# Patient Record
Sex: FEMALE | Race: WHITE | NOT HISPANIC OR LATINO
[De-identification: names, ages, dates, MRNs, and addresses within clinical notes are randomized per-mention and may not be internally consistent; named-entity substitution may affect disease eponyms.]

---

## 2017-01-23 ENCOUNTER — APPOINTMENT (OUTPATIENT)
Dept: OPHTHALMOLOGY | Facility: CLINIC | Age: 77
End: 2017-01-23

## 2017-01-30 ENCOUNTER — APPOINTMENT (OUTPATIENT)
Dept: OPHTHALMOLOGY | Facility: CLINIC | Age: 77
End: 2017-01-30

## 2017-05-03 ENCOUNTER — FORM ENCOUNTER (OUTPATIENT)
Age: 77
End: 2017-05-03

## 2017-05-04 ENCOUNTER — APPOINTMENT (OUTPATIENT)
Dept: ULTRASOUND IMAGING | Facility: HOSPITAL | Age: 77
End: 2017-05-04

## 2017-05-04 ENCOUNTER — APPOINTMENT (OUTPATIENT)
Dept: RADIOLOGY | Facility: HOSPITAL | Age: 77
End: 2017-05-04

## 2017-05-04 ENCOUNTER — OUTPATIENT (OUTPATIENT)
Dept: OUTPATIENT SERVICES | Facility: HOSPITAL | Age: 77
LOS: 1 days | End: 2017-05-04
Payer: MEDICARE

## 2017-05-04 DIAGNOSIS — R14.0 ABDOMINAL DISTENSION (GASEOUS): ICD-10-CM

## 2017-05-04 DIAGNOSIS — K44.9 DIAPHRAGMATIC HERNIA WITHOUT OBSTRUCTION OR GANGRENE: ICD-10-CM

## 2017-05-04 PROCEDURE — 76700 US EXAM ABDOM COMPLETE: CPT

## 2017-05-04 PROCEDURE — 71046 X-RAY EXAM CHEST 2 VIEWS: CPT

## 2017-05-22 ENCOUNTER — APPOINTMENT (OUTPATIENT)
Dept: OPHTHALMOLOGY | Facility: CLINIC | Age: 77
End: 2017-05-22

## 2017-06-05 ENCOUNTER — APPOINTMENT (OUTPATIENT)
Dept: CT IMAGING | Facility: HOSPITAL | Age: 77
End: 2017-06-05

## 2017-06-05 ENCOUNTER — OUTPATIENT (OUTPATIENT)
Dept: OUTPATIENT SERVICES | Facility: HOSPITAL | Age: 77
LOS: 1 days | End: 2017-06-05
Payer: MEDICARE

## 2017-06-05 DIAGNOSIS — K44.9 DIAPHRAGMATIC HERNIA WITHOUT OBSTRUCTION OR GANGRENE: ICD-10-CM

## 2017-06-05 PROCEDURE — 74176 CT ABD & PELVIS W/O CONTRAST: CPT

## 2017-10-02 ENCOUNTER — APPOINTMENT (OUTPATIENT)
Dept: OPHTHALMOLOGY | Facility: CLINIC | Age: 77
End: 2017-10-02
Payer: MEDICARE

## 2017-10-02 DIAGNOSIS — H15.002 UNSPECIFIED SCLERITIS, LEFT EYE: ICD-10-CM

## 2017-10-02 PROCEDURE — 92012 INTRM OPH EXAM EST PATIENT: CPT

## 2017-10-05 ENCOUNTER — APPOINTMENT (OUTPATIENT)
Dept: INTERNAL MEDICINE | Facility: CLINIC | Age: 77
End: 2017-10-05
Payer: MEDICARE

## 2017-10-05 PROCEDURE — G0008: CPT

## 2017-10-05 PROCEDURE — 90662 IIV NO PRSV INCREASED AG IM: CPT

## 2017-10-18 ENCOUNTER — APPOINTMENT (OUTPATIENT)
Dept: INTERNAL MEDICINE | Facility: CLINIC | Age: 77
End: 2017-10-18
Payer: MEDICARE

## 2017-10-18 VITALS
HEIGHT: 63 IN | BODY MASS INDEX: 26.05 KG/M2 | WEIGHT: 147 LBS | HEART RATE: 76 BPM | OXYGEN SATURATION: 99 % | SYSTOLIC BLOOD PRESSURE: 144 MMHG | DIASTOLIC BLOOD PRESSURE: 77 MMHG

## 2017-10-18 DIAGNOSIS — R04.0 EPISTAXIS: ICD-10-CM

## 2017-10-18 DIAGNOSIS — Z00.00 ENCOUNTER FOR GENERAL ADULT MEDICAL EXAMINATION W/OUT ABNORMAL FINDINGS: ICD-10-CM

## 2017-10-18 PROCEDURE — 99214 OFFICE O/P EST MOD 30 MIN: CPT

## 2017-10-18 RX ORDER — BUDESONIDE 3 MG/1
3 CAPSULE, COATED PELLETS ORAL
Refills: 0 | Status: ACTIVE | COMMUNITY

## 2017-12-01 ENCOUNTER — APPOINTMENT (OUTPATIENT)
Dept: MAMMOGRAPHY | Facility: HOSPITAL | Age: 77
End: 2017-12-01

## 2017-12-08 LAB
25(OH)D3 SERPL-MCNC: 30.6 NG/ML
ALBUMIN SERPL ELPH-MCNC: 3.4 G/DL
ALP BLD-CCNC: 264 U/L
ALT SERPL-CCNC: 17 U/L
ANION GAP SERPL CALC-SCNC: 17 MMOL/L
APPEARANCE: CLEAR
AST SERPL-CCNC: 17 U/L
BACTERIA: NEGATIVE
BASOPHILS # BLD AUTO: 0.01 K/UL
BASOPHILS NFR BLD AUTO: 0.2 %
BILIRUB SERPL-MCNC: 0.4 MG/DL
BILIRUBIN URINE: NEGATIVE
BLOOD URINE: NEGATIVE
BUN SERPL-MCNC: 14 MG/DL
CALCIUM SERPL-MCNC: 8.9 MG/DL
CHLORIDE SERPL-SCNC: 101 MMOL/L
CHOLEST SERPL-MCNC: 181 MG/DL
CHOLEST/HDLC SERPL: 3.2 RATIO
CO2 SERPL-SCNC: 29 MMOL/L
COLOR: YELLOW
CREAT SERPL-MCNC: 0.66 MG/DL
EOSINOPHIL # BLD AUTO: 0.05 K/UL
EOSINOPHIL NFR BLD AUTO: 0.9
ERYTHROCYTE [SEDIMENTATION RATE] IN BLOOD BY WESTERGREN METHOD: 63 MM/HR
GLUCOSE QUALITATIVE U: NEGATIVE MG/DL
GLUCOSE SERPL-MCNC: 97 MG/DL
HBA1C MFR BLD HPLC: 6.1 %
HCT VFR BLD CALC: 37.5 %
HDLC SERPL-MCNC: 57 MG/DL
HGB BLD-MCNC: 11.9 G/DL
IMM GRANULOCYTES NFR BLD AUTO: 0.9 %
KETONES URINE: NEGATIVE
LDLC SERPL CALC-MCNC: 99 MG/DL
LEUKOCYTE ESTERASE URINE: NEGATIVE
LYMPHOCYTES # BLD AUTO: 1.08 K/UL
LYMPHOCYTES NFR BLD AUTO: 18.5 %
MAN DIFF?: NORMAL
MCHC RBC-ENTMCNC: 28.2 PG
MCHC RBC-ENTMCNC: 31.7 GM/DL
MCV RBC AUTO: 88.9 FL
MICROSCOPIC-UA: NORMAL
MONOCYTES # BLD AUTO: 0.34 K/UL
MONOCYTES NFR BLD AUTO: 5.8 %
NEUTROPHILS # BLD AUTO: 4.32 K/UL
NEUTROPHILS NFR BLD AUTO: 73.7 %
NITRITE URINE: NEGATIVE
PH URINE: 7
PLATELET # BLD AUTO: 471 K/UL
POTASSIUM SERPL-SCNC: 3.8 MMOL/L
PROT SERPL-MCNC: 6.8 G/DL
PROTEIN URINE: NEGATIVE MG/DL
RBC # BLD: 4.22 M/UL
RBC # FLD: 16 %
RED BLOOD CELLS URINE: 0 /HPF
SODIUM SERPL-SCNC: 147 MMOL/L
SPECIFIC GRAVITY URINE: 1
SQUAMOUS EPITHELIAL CELLS: 1 /HPF
T4 FREE SERPL-MCNC: 1.3 NG/DL
T4 SERPL-MCNC: 8.7 UG/DL
TRIGL SERPL-MCNC: 125 MG/DL
TSH SERPL-ACNC: 0.71 UIU/ML
UROBILINOGEN URINE: NEGATIVE MG/DL
WBC # FLD AUTO: 5.85 K/UL
WHITE BLOOD CELLS URINE: 1 /HPF

## 2017-12-13 ENCOUNTER — RX RENEWAL (OUTPATIENT)
Age: 77
End: 2017-12-13

## 2017-12-14 ENCOUNTER — RX RENEWAL (OUTPATIENT)
Age: 77
End: 2017-12-14

## 2018-02-15 ENCOUNTER — APPOINTMENT (OUTPATIENT)
Dept: OPHTHALMOLOGY | Facility: CLINIC | Age: 78
End: 2018-02-15

## 2018-04-19 ENCOUNTER — APPOINTMENT (OUTPATIENT)
Dept: OPHTHALMOLOGY | Facility: CLINIC | Age: 78
End: 2018-04-19
Payer: MEDICARE

## 2018-04-19 DIAGNOSIS — H40.051 OCULAR HYPERTENSION, RIGHT EYE: ICD-10-CM

## 2018-04-19 DIAGNOSIS — H11.31 CONJUNCTIVAL HEMORRHAGE, RIGHT EYE: ICD-10-CM

## 2018-04-19 PROCEDURE — 92012 INTRM OPH EXAM EST PATIENT: CPT

## 2018-06-08 ENCOUNTER — OUTPATIENT (OUTPATIENT)
Dept: OUTPATIENT SERVICES | Facility: HOSPITAL | Age: 78
LOS: 1 days | End: 2018-06-08
Payer: MEDICARE

## 2018-06-08 DIAGNOSIS — K50.90 CROHN'S DISEASE, UNSPECIFIED, WITHOUT COMPLICATIONS: ICD-10-CM

## 2018-06-08 PROCEDURE — 94060 EVALUATION OF WHEEZING: CPT

## 2018-06-08 PROCEDURE — 94060 EVALUATION OF WHEEZING: CPT | Mod: 26

## 2018-06-08 PROCEDURE — 94726 PLETHYSMOGRAPHY LUNG VOLUMES: CPT

## 2018-06-08 PROCEDURE — 94729 DIFFUSING CAPACITY: CPT

## 2018-08-23 ENCOUNTER — APPOINTMENT (OUTPATIENT)
Dept: OPHTHALMOLOGY | Facility: CLINIC | Age: 78
End: 2018-08-23

## 2018-08-29 ENCOUNTER — APPOINTMENT (OUTPATIENT)
Dept: OPHTHALMOLOGY | Facility: CLINIC | Age: 78
End: 2018-08-29
Payer: MEDICARE

## 2018-08-29 DIAGNOSIS — H15.102 UNSPECIFIED EPISCLERITIS, LEFT EYE: ICD-10-CM

## 2018-08-29 PROCEDURE — 92012 INTRM OPH EXAM EST PATIENT: CPT

## 2018-10-11 ENCOUNTER — APPOINTMENT (OUTPATIENT)
Dept: INTERNAL MEDICINE | Facility: CLINIC | Age: 78
End: 2018-10-11
Payer: MEDICARE

## 2018-10-11 PROCEDURE — 90662 IIV NO PRSV INCREASED AG IM: CPT

## 2018-10-11 PROCEDURE — G0008: CPT

## 2018-11-13 ENCOUNTER — APPOINTMENT (OUTPATIENT)
Dept: INTERNAL MEDICINE | Facility: CLINIC | Age: 78
End: 2018-11-13
Payer: MEDICARE

## 2018-11-13 VITALS
WEIGHT: 149 LBS | BODY MASS INDEX: 26.4 KG/M2 | SYSTOLIC BLOOD PRESSURE: 130 MMHG | DIASTOLIC BLOOD PRESSURE: 70 MMHG | RESPIRATION RATE: 16 BRPM | HEIGHT: 63 IN | HEART RATE: 70 BPM

## 2018-11-13 DIAGNOSIS — Z23 ENCOUNTER FOR IMMUNIZATION: ICD-10-CM

## 2018-11-13 PROCEDURE — 99214 OFFICE O/P EST MOD 30 MIN: CPT | Mod: 25

## 2018-11-13 PROCEDURE — 90670 PCV13 VACCINE IM: CPT

## 2018-11-13 PROCEDURE — G0009: CPT

## 2018-11-13 NOTE — HISTORY OF PRESENT ILLNESS
[FreeTextEntry1] : General follow up , CKD concerns, GI recent folllow up on budesonide for Crohn's disease [de-identified] : Now on budesoninde 3 caps a day for 6weeks, tapering doses\par

## 2019-05-06 ENCOUNTER — MED ADMIN CHARGE (OUTPATIENT)
Age: 79
End: 2019-05-06

## 2019-05-06 ENCOUNTER — APPOINTMENT (OUTPATIENT)
Dept: INTERNAL MEDICINE | Facility: CLINIC | Age: 79
End: 2019-05-06
Payer: MEDICARE

## 2019-05-06 PROCEDURE — 86580 TB INTRADERMAL TEST: CPT

## 2019-05-08 ENCOUNTER — OTHER (OUTPATIENT)
Age: 79
End: 2019-05-08

## 2019-05-13 ENCOUNTER — APPOINTMENT (OUTPATIENT)
Dept: OPHTHALMOLOGY | Facility: CLINIC | Age: 79
End: 2019-05-13
Payer: MEDICARE

## 2019-05-24 ENCOUNTER — APPOINTMENT (OUTPATIENT)
Dept: OPHTHALMOLOGY | Facility: CLINIC | Age: 79
End: 2019-05-24
Payer: MEDICARE

## 2019-05-24 DIAGNOSIS — H18.20 UNSPECIFIED CORNEAL EDEMA: ICD-10-CM

## 2019-05-24 DIAGNOSIS — H26.9 UNSPECIFIED CATARACT: ICD-10-CM

## 2019-05-24 DIAGNOSIS — H40.003 PREGLAUCOMA, UNSPECIFIED, BILATERAL: ICD-10-CM

## 2019-05-24 PROCEDURE — 92133 CPTRZD OPH DX IMG PST SGM ON: CPT

## 2019-05-24 PROCEDURE — 76514 ECHO EXAM OF EYE THICKNESS: CPT

## 2019-05-24 PROCEDURE — 92286 ANT SGM IMG I&R SPECLR MIC: CPT

## 2019-05-24 PROCEDURE — 92012 INTRM OPH EXAM EST PATIENT: CPT

## 2019-08-14 ENCOUNTER — APPOINTMENT (OUTPATIENT)
Dept: INTERNAL MEDICINE | Facility: CLINIC | Age: 79
End: 2019-08-14
Payer: MEDICARE

## 2019-08-14 ENCOUNTER — RX RENEWAL (OUTPATIENT)
Age: 79
End: 2019-08-14

## 2019-08-14 VITALS
HEIGHT: 63 IN | DIASTOLIC BLOOD PRESSURE: 62 MMHG | WEIGHT: 144 LBS | RESPIRATION RATE: 16 BRPM | BODY MASS INDEX: 25.52 KG/M2 | TEMPERATURE: 98.1 F | HEART RATE: 76 BPM | OXYGEN SATURATION: 98 % | SYSTOLIC BLOOD PRESSURE: 122 MMHG

## 2019-08-14 DIAGNOSIS — R73.9 HYPERGLYCEMIA, UNSPECIFIED: ICD-10-CM

## 2019-08-14 DIAGNOSIS — Z87.09 PERSONAL HISTORY OF OTHER DISEASES OF THE RESPIRATORY SYSTEM: ICD-10-CM

## 2019-08-14 DIAGNOSIS — M79.669 PAIN IN UNSPECIFIED LOWER LEG: ICD-10-CM

## 2019-08-14 PROCEDURE — 99214 OFFICE O/P EST MOD 30 MIN: CPT

## 2019-08-14 NOTE — HISTORY OF PRESENT ILLNESS
[FreeTextEntry1] : Sinus infection and due to start humira; also wants to have arteries checked [de-identified] : Starting humira by GI\par Stomach "hard"\par \par Sinus "infection" \par -nasal drip, mucus, milky \par -no fever\par -no cough\par +HA\par \par no dysuria

## 2019-08-14 NOTE — PHYSICAL EXAM
[No Acute Distress] : no acute distress [Well Nourished] : well nourished [Well Developed] : well developed [Well-Appearing] : well-appearing [Normal Sclera/Conjunctiva] : normal sclera/conjunctiva [PERRL] : pupils equal round and reactive to light [Normal Outer Ear/Nose] : the outer ears and nose were normal in appearance [EOMI] : extraocular movements intact [Normal Oropharynx] : the oropharynx was normal [No Lymphadenopathy] : no lymphadenopathy [No JVD] : no jugular venous distention [Supple] : supple [Thyroid Normal, No Nodules] : the thyroid was normal and there were no nodules present [No Respiratory Distress] : no respiratory distress  [No Accessory Muscle Use] : no accessory muscle use [Normal Rate] : normal rate  [Clear to Auscultation] : lungs were clear to auscultation bilaterally [Regular Rhythm] : with a regular rhythm [No Murmur] : no murmur heard [Normal S1, S2] : normal S1 and S2 [No Carotid Bruits] : no carotid bruits [No Abdominal Bruit] : a ~M bruit was not heard ~T in the abdomen [No Varicosities] : no varicosities [Pedal Pulses Present] : the pedal pulses are present [No Edema] : there was no peripheral edema [No Palpable Aorta] : no palpable aorta [No Extremity Clubbing/Cyanosis] : no extremity clubbing/cyanosis [Soft] : abdomen soft [Non Tender] : non-tender [Non-distended] : non-distended [No Masses] : no abdominal mass palpated [Normal Bowel Sounds] : normal bowel sounds [No HSM] : no HSM [Normal Anterior Cervical Nodes] : no anterior cervical lymphadenopathy [Normal Posterior Cervical Nodes] : no posterior cervical lymphadenopathy [No Spinal Tenderness] : no spinal tenderness [No CVA Tenderness] : no CVA  tenderness [No Joint Swelling] : no joint swelling [Grossly Normal Strength/Tone] : grossly normal strength/tone [Coordination Grossly Intact] : coordination grossly intact [No Rash] : no rash [No Focal Deficits] : no focal deficits [Normal Gait] : normal gait [Deep Tendon Reflexes (DTR)] : deep tendon reflexes were 2+ and symmetric [Normal Affect] : the affect was normal [Normal Insight/Judgement] : insight and judgment were intact

## 2019-08-18 LAB
25(OH)D3 SERPL-MCNC: 24.2 NG/ML
ALBUMIN SERPL ELPH-MCNC: 4.2 G/DL
ALP BLD-CCNC: 691 U/L
ALT SERPL-CCNC: 13 U/L
ANION GAP SERPL CALC-SCNC: 11 MMOL/L
AST SERPL-CCNC: 30 U/L
BASOPHILS # BLD AUTO: 0.04 K/UL
BASOPHILS NFR BLD AUTO: 0.7 %
BILIRUB SERPL-MCNC: 0.4 MG/DL
BUN SERPL-MCNC: 11 MG/DL
CALCIUM SERPL-MCNC: 9.5 MG/DL
CHLORIDE SERPL-SCNC: 104 MMOL/L
CHOLEST SERPL-MCNC: 298 MG/DL
CHOLEST/HDLC SERPL: 3.7 RATIO
CO2 SERPL-SCNC: 30 MMOL/L
CREAT SERPL-MCNC: 0.77 MG/DL
EOSINOPHIL # BLD AUTO: 0.15 K/UL
EOSINOPHIL NFR BLD AUTO: 2.5 %
ESTIMATED AVERAGE GLUCOSE: 108 MG/DL
GLUCOSE SERPL-MCNC: 110 MG/DL
HBA1C MFR BLD HPLC: 5.4 %
HCT VFR BLD CALC: 43.4 %
HDLC SERPL-MCNC: 80 MG/DL
HGB BLD-MCNC: 13.6 G/DL
IMM GRANULOCYTES NFR BLD AUTO: 0.3 %
LDLC SERPL CALC-MCNC: 183 MG/DL
LYMPHOCYTES # BLD AUTO: 1.53 K/UL
LYMPHOCYTES NFR BLD AUTO: 25.8 %
MAN DIFF?: NORMAL
MCHC RBC-ENTMCNC: 29.6 PG
MCHC RBC-ENTMCNC: 31.3 GM/DL
MCV RBC AUTO: 94.6 FL
MONOCYTES # BLD AUTO: 0.49 K/UL
MONOCYTES NFR BLD AUTO: 8.2 %
NEUTROPHILS # BLD AUTO: 3.71 K/UL
NEUTROPHILS NFR BLD AUTO: 62.5 %
PLATELET # BLD AUTO: 259 K/UL
POTASSIUM SERPL-SCNC: 5.7 MMOL/L
PROT SERPL-MCNC: 7.2 G/DL
RBC # BLD: 4.59 M/UL
RBC # FLD: 14.1 %
SODIUM SERPL-SCNC: 145 MMOL/L
TRIGL SERPL-MCNC: 176 MG/DL
TSH SERPL-ACNC: 1.35 UIU/ML
WBC # FLD AUTO: 5.94 K/UL

## 2019-08-20 ENCOUNTER — FORM ENCOUNTER (OUTPATIENT)
Age: 79
End: 2019-08-20

## 2019-08-20 PROBLEM — E78.5 HYPERLIPEMIA: Status: ACTIVE | Noted: 2019-08-20

## 2019-08-21 ENCOUNTER — OUTPATIENT (OUTPATIENT)
Dept: OUTPATIENT SERVICES | Facility: HOSPITAL | Age: 79
LOS: 1 days | End: 2019-08-21
Payer: MEDICARE

## 2019-08-21 ENCOUNTER — APPOINTMENT (OUTPATIENT)
Dept: ULTRASOUND IMAGING | Facility: HOSPITAL | Age: 79
End: 2019-08-21

## 2019-08-21 DIAGNOSIS — M79.606 PAIN IN LEG, UNSPECIFIED: ICD-10-CM

## 2019-08-21 PROCEDURE — 76700 US EXAM ABDOM COMPLETE: CPT

## 2019-08-21 PROCEDURE — 76700 US EXAM ABDOM COMPLETE: CPT | Mod: 26

## 2019-08-21 PROCEDURE — 93970 EXTREMITY STUDY: CPT | Mod: 26

## 2019-08-21 PROCEDURE — 93970 EXTREMITY STUDY: CPT

## 2019-08-22 ENCOUNTER — APPOINTMENT (OUTPATIENT)
Dept: ULTRASOUND IMAGING | Facility: HOSPITAL | Age: 79
End: 2019-08-22

## 2019-08-22 DIAGNOSIS — E78.5 HYPERLIPIDEMIA, UNSPECIFIED: ICD-10-CM

## 2019-08-23 LAB
ALBUMIN SERPL ELPH-MCNC: 4.2 G/DL
ALP BLD-CCNC: 650 U/L
ALT SERPL-CCNC: 14 U/L
ANION GAP SERPL CALC-SCNC: 12 MMOL/L
AST SERPL-CCNC: 38 U/L
BILIRUB SERPL-MCNC: 0.5 MG/DL
BUN SERPL-MCNC: 11 MG/DL
CALCIUM SERPL-MCNC: 9.3 MG/DL
CHLORIDE SERPL-SCNC: 104 MMOL/L
CHOLEST SERPL-MCNC: 267 MG/DL
CHOLEST/HDLC SERPL: 3.4 RATIO
CO2 SERPL-SCNC: 28 MMOL/L
CREAT SERPL-MCNC: 0.67 MG/DL
GLUCOSE SERPL-MCNC: 97 MG/DL
HDLC SERPL-MCNC: 78 MG/DL
LDLC SERPL CALC-MCNC: 161 MG/DL
POTASSIUM SERPL-SCNC: 4.5 MMOL/L
PROT SERPL-MCNC: 6.5 G/DL
SODIUM SERPL-SCNC: 144 MMOL/L
TRIGL SERPL-MCNC: 139 MG/DL

## 2019-09-16 ENCOUNTER — NON-APPOINTMENT (OUTPATIENT)
Age: 79
End: 2019-09-16

## 2019-09-16 ENCOUNTER — APPOINTMENT (OUTPATIENT)
Dept: OPHTHALMOLOGY | Facility: CLINIC | Age: 79
End: 2019-09-16
Payer: MEDICARE

## 2019-09-16 PROCEDURE — 92012 INTRM OPH EXAM EST PATIENT: CPT

## 2019-10-03 ENCOUNTER — APPOINTMENT (OUTPATIENT)
Dept: INTERNAL MEDICINE | Facility: CLINIC | Age: 79
End: 2019-10-03
Payer: MEDICARE

## 2019-10-03 PROCEDURE — 90662 IIV NO PRSV INCREASED AG IM: CPT

## 2019-10-03 PROCEDURE — G0008: CPT

## 2019-11-14 ENCOUNTER — APPOINTMENT (OUTPATIENT)
Dept: INTERNAL MEDICINE | Facility: CLINIC | Age: 79
End: 2019-11-14
Payer: MEDICARE

## 2019-11-14 DIAGNOSIS — Z23 ENCOUNTER FOR IMMUNIZATION: ICD-10-CM

## 2019-11-14 PROCEDURE — G0009: CPT

## 2019-11-14 PROCEDURE — 90732 PPSV23 VACC 2 YRS+ SUBQ/IM: CPT

## 2020-04-10 ENCOUNTER — APPOINTMENT (OUTPATIENT)
Dept: OPHTHALMOLOGY | Facility: CLINIC | Age: 80
End: 2020-04-10

## 2020-10-14 ENCOUNTER — APPOINTMENT (OUTPATIENT)
Dept: INTERNAL MEDICINE | Facility: CLINIC | Age: 80
End: 2020-10-14
Payer: MEDICARE

## 2020-10-14 ENCOUNTER — NON-APPOINTMENT (OUTPATIENT)
Age: 80
End: 2020-10-14

## 2020-10-14 VITALS
TEMPERATURE: 97.3 F | DIASTOLIC BLOOD PRESSURE: 60 MMHG | RESPIRATION RATE: 16 BRPM | SYSTOLIC BLOOD PRESSURE: 120 MMHG | HEIGHT: 63 IN | BODY MASS INDEX: 25.87 KG/M2 | WEIGHT: 146 LBS | OXYGEN SATURATION: 98 % | HEART RATE: 78 BPM

## 2020-10-14 DIAGNOSIS — I87.8 OTHER SPECIFIED DISORDERS OF VEINS: ICD-10-CM

## 2020-10-14 DIAGNOSIS — Z23 ENCOUNTER FOR IMMUNIZATION: ICD-10-CM

## 2020-10-14 PROCEDURE — 90662 IIV NO PRSV INCREASED AG IM: CPT

## 2020-10-14 PROCEDURE — 99497 ADVNCD CARE PLAN 30 MIN: CPT | Mod: 33

## 2020-10-14 PROCEDURE — G0442 ANNUAL ALCOHOL SCREEN 15 MIN: CPT | Mod: 59

## 2020-10-14 PROCEDURE — G0008: CPT

## 2020-10-14 PROCEDURE — G0439: CPT

## 2020-10-14 PROCEDURE — G0444 DEPRESSION SCREEN ANNUAL: CPT | Mod: 59

## 2020-10-14 RX ORDER — LOTEPREDNOL ETABONATE 5 MG/ML
0.5 SUSPENSION/ DROPS OPHTHALMIC EVERY OTHER DAY
Qty: 1 | Refills: 1 | Status: DISCONTINUED | COMMUNITY
Start: 2017-12-13 | End: 2020-10-14

## 2020-10-14 RX ORDER — LEVOFLOXACIN 500 MG/1
500 TABLET, FILM COATED ORAL DAILY
Qty: 7 | Refills: 0 | Status: DISCONTINUED | COMMUNITY
Start: 2019-08-14 | End: 2020-10-14

## 2020-10-14 RX ORDER — LOTEPREDNOL ETABONATE 5 MG/ML
0.5 SUSPENSION/ DROPS OPHTHALMIC EVERY OTHER DAY
Qty: 1 | Refills: 0 | Status: DISCONTINUED | COMMUNITY
Start: 2017-12-13 | End: 2020-10-14

## 2020-10-14 RX ORDER — FLUTICASONE PROPIONATE 50 UG/1
50 SPRAY, METERED NASAL TWICE DAILY
Qty: 48 | Refills: 2 | Status: DISCONTINUED | COMMUNITY
Start: 2019-08-14 | End: 2020-10-14

## 2020-10-14 NOTE — HISTORY OF PRESENT ILLNESS
[FreeTextEntry1] : Here for annual [de-identified] : Crohn's disease one year on Humira Q 2 weeks some relief but with stress and food triggers

## 2020-10-14 NOTE — HEALTH RISK ASSESSMENT
[Good] : ~his/her~  mood as  good [1 or 2 (0 pts)] : 1 or 2 (0 points) [Never (0 pts)] : Never (0 points) [No] : In the past 12 months have you used drugs other than those required for medical reasons? No [Two or more falls in past year] : Patient reported two or more falls in the past year [0] : 2) Feeling down, depressed, or hopeless: Not at all (0) [Patient reported mammogram was normal] : Patient reported mammogram was normal [Patient reported PAP Smear was normal] : Patient reported PAP Smear was normal [Patient reported bone density results were normal] : Patient reported bone density results were normal [Patient reported colonoscopy was normal] : Patient reported colonoscopy was normal [HIV test declined] : HIV test declined [Hepatitis C test declined] : Hepatitis C test declined [None] : None [With Significant Other] : lives with significant other [Retired] : retired [] :  [# Of Children ___] : has [unfilled] children [Feels Safe at Home] : Feels safe at home [Fully functional (bathing, dressing, toileting, transferring, walking, feeding)] : Fully functional (bathing, dressing, toileting, transferring, walking, feeding) [Fully functional (using the telephone, shopping, preparing meals, housekeeping, doing laundry, using] : Fully functional and needs no help or supervision to perform IADLs (using the telephone, shopping, preparing meals, housekeeping, doing laundry, using transportation, managing medications and managing finances) [Smoke Detector] : smoke detector [Carbon Monoxide Detector] : carbon monoxide detector [Seat Belt] :  uses seat belt [Sunscreen] : uses sunscreen [With Patient/Caregiver] : With Patient/Caregiver [Designated Healthcare Proxy] : Designated healthcare proxy [Name: ___] : Health Care Proxy's Name: [unfilled]  [Relationship: ___] : Relationship: [unfilled] [Aggressive treatment] : aggressive treatment [I will adhere to the patient's wishes as expressed in the advance directive except as noted below.] : I will adhere to the patient's wishes as expressed in the advance directive except as noted below [] : No [de-identified] : none [de-identified] : Dr Dsouza, Podiatry [de-identified] : a littler bit [de-identified] : good [Change in mental status noted] : No change in mental status noted [Language] : denies difficulty with language [Behavior] : denies difficulty with behavior [Learning/Retaining New Information] : denies difficulty learning/retaining new information [Handling Complex Tasks] : denies difficulty handling complex tasks [Reasoning] : denies difficulty with reasoning [Spatial Ability and Orientation] : denies difficulty with spatial ability and orientation [Sexually Active] : not sexually active [Reports changes in hearing] : Reports no changes in hearing [Reports changes in vision] : Reports no changes in vision [Reports changes in dental health] : Reports no changes in dental health [MammogramDate] : 2016 [PapSmearDate] : 2016 [BoneDensityDate] : 2010 [ColonoscopyDate] : 2017 [de-identified] : off all eye drops [FreeTextEntry4] : Goals of care conversation on this visit also discussed situation with patient that in the event they suffer an injury or accident where there is little chance for meaningful life, the patient states that they do NOT want to be maintained in this state ("as a vegetable"), and that there should be NO LIFE SUSTAINING MEASURES in this instance\par This includes example of being brain dead, not be be maintained on mechanical ventilation, and no feeding tubes\par \par Other treatments in cases where there IS a chance for meaningful recovery that were deemed acceptable to the patient include\par -hospializaton for most conditions including treatment for typical community acquired medical conditions such as pneumonia, heart related issues, GI Issues, etc. \par -IV fluids\par -blood transfusions\par -antibiotics\par \par GOC 16mins\par

## 2020-10-15 LAB
25(OH)D3 SERPL-MCNC: 34.5 NG/ML
ALBUMIN SERPL ELPH-MCNC: 4.5 G/DL
ALP BLD-CCNC: 1058 U/L
ALT SERPL-CCNC: 15 U/L
ANION GAP SERPL CALC-SCNC: 15 MMOL/L
APPEARANCE: CLEAR
AST SERPL-CCNC: 43 U/L
BACTERIA: NEGATIVE
BASOPHILS # BLD AUTO: 0.04 K/UL
BASOPHILS NFR BLD AUTO: 0.7 %
BILIRUB SERPL-MCNC: 0.5 MG/DL
BILIRUBIN URINE: NEGATIVE
BLOOD URINE: NEGATIVE
BUN SERPL-MCNC: 12 MG/DL
CALCIUM SERPL-MCNC: 9 MG/DL
CHLORIDE SERPL-SCNC: 104 MMOL/L
CHOLEST SERPL-MCNC: 308 MG/DL
CHOLEST/HDLC SERPL: 3.8 RATIO
CO2 SERPL-SCNC: 24 MMOL/L
COLOR: NORMAL
CREAT SERPL-MCNC: 0.71 MG/DL
EOSINOPHIL # BLD AUTO: 0.12 K/UL
EOSINOPHIL NFR BLD AUTO: 2.1 %
ESTIMATED AVERAGE GLUCOSE: 114 MG/DL
GLUCOSE QUALITATIVE U: NEGATIVE
GLUCOSE SERPL-MCNC: 97 MG/DL
HBA1C MFR BLD HPLC: 5.6 %
HCT VFR BLD CALC: 43.7 %
HDLC SERPL-MCNC: 81 MG/DL
HGB BLD-MCNC: 13.9 G/DL
HYALINE CASTS: 0 /LPF
IMM GRANULOCYTES NFR BLD AUTO: 0.2 %
KETONES URINE: NEGATIVE
LDLC SERPL CALC-MCNC: 198 MG/DL
LEUKOCYTE ESTERASE URINE: ABNORMAL
LYMPHOCYTES # BLD AUTO: 1.9 K/UL
LYMPHOCYTES NFR BLD AUTO: 33.6 %
MAN DIFF?: NORMAL
MCHC RBC-ENTMCNC: 28.3 PG
MCHC RBC-ENTMCNC: 31.8 GM/DL
MCV RBC AUTO: 88.8 FL
MICROSCOPIC-UA: NORMAL
MONOCYTES # BLD AUTO: 0.47 K/UL
MONOCYTES NFR BLD AUTO: 8.3 %
NEUTROPHILS # BLD AUTO: 3.12 K/UL
NEUTROPHILS NFR BLD AUTO: 55.1 %
NITRITE URINE: NEGATIVE
PH URINE: 6
PLATELET # BLD AUTO: 242 K/UL
POTASSIUM SERPL-SCNC: 4.5 MMOL/L
PROT SERPL-MCNC: 7.3 G/DL
PROTEIN URINE: NEGATIVE
RBC # BLD: 4.92 M/UL
RBC # FLD: 14.2 %
RED BLOOD CELLS URINE: 1 /HPF
SARS-COV-2 IGG SERPL IA-ACNC: <0.1 INDEX
SARS-COV-2 IGG SERPL QL IA: NEGATIVE
SODIUM SERPL-SCNC: 143 MMOL/L
SPECIFIC GRAVITY URINE: 1.01
SQUAMOUS EPITHELIAL CELLS: 1 /HPF
TRIGL SERPL-MCNC: 146 MG/DL
TSH SERPL-ACNC: 0.71 UIU/ML
UROBILINOGEN URINE: NORMAL
WBC # FLD AUTO: 5.66 K/UL
WHITE BLOOD CELLS URINE: 5 /HPF

## 2020-10-19 ENCOUNTER — APPOINTMENT (OUTPATIENT)
Dept: ULTRASOUND IMAGING | Facility: CLINIC | Age: 80
End: 2020-10-19
Payer: MEDICARE

## 2020-10-19 ENCOUNTER — OUTPATIENT (OUTPATIENT)
Dept: OUTPATIENT SERVICES | Facility: HOSPITAL | Age: 80
LOS: 1 days | End: 2020-10-19
Payer: MEDICARE

## 2020-10-19 DIAGNOSIS — I87.8 OTHER SPECIFIED DISORDERS OF VEINS: ICD-10-CM

## 2020-10-19 PROCEDURE — 93970 EXTREMITY STUDY: CPT

## 2020-10-19 PROCEDURE — 93970 EXTREMITY STUDY: CPT | Mod: 26

## 2020-10-24 ENCOUNTER — APPOINTMENT (OUTPATIENT)
Dept: RADIOLOGY | Facility: HOSPITAL | Age: 80
End: 2020-10-24
Payer: MEDICARE

## 2020-10-24 ENCOUNTER — OUTPATIENT (OUTPATIENT)
Dept: OUTPATIENT SERVICES | Facility: HOSPITAL | Age: 80
LOS: 1 days | End: 2020-10-24
Payer: MEDICARE

## 2020-10-24 DIAGNOSIS — Z00.8 ENCOUNTER FOR OTHER GENERAL EXAMINATION: ICD-10-CM

## 2020-10-24 PROCEDURE — 77080 DXA BONE DENSITY AXIAL: CPT

## 2020-10-24 PROCEDURE — 77080 DXA BONE DENSITY AXIAL: CPT | Mod: 26

## 2020-10-30 ENCOUNTER — RX RENEWAL (OUTPATIENT)
Age: 80
End: 2020-10-30

## 2020-10-30 RX ORDER — CALCITONIN SALMON 200 [IU]/1
200 SPRAY, METERED NASAL DAILY
Qty: 11.1 | Refills: 0 | Status: ACTIVE | COMMUNITY
Start: 2020-10-30 | End: 1900-01-01

## 2020-12-21 PROBLEM — Z87.09 HISTORY OF ACUTE SINUSITIS: Status: RESOLVED | Noted: 2019-08-14 | Resolved: 2020-12-21

## 2021-05-12 ENCOUNTER — APPOINTMENT (OUTPATIENT)
Dept: INTERNAL MEDICINE | Facility: CLINIC | Age: 81
End: 2021-05-12
Payer: MEDICARE

## 2021-05-12 VITALS
TEMPERATURE: 98 F | OXYGEN SATURATION: 98 % | HEART RATE: 90 BPM | RESPIRATION RATE: 16 BRPM | SYSTOLIC BLOOD PRESSURE: 126 MMHG | DIASTOLIC BLOOD PRESSURE: 70 MMHG

## 2021-05-12 DIAGNOSIS — R26.81 UNSTEADINESS ON FEET: ICD-10-CM

## 2021-05-12 DIAGNOSIS — M25.562 PAIN IN LEFT KNEE: ICD-10-CM

## 2021-05-12 PROCEDURE — 99214 OFFICE O/P EST MOD 30 MIN: CPT

## 2021-05-12 NOTE — HISTORY OF PRESENT ILLNESS
[FreeTextEntry1] : Here for a few things see below [de-identified] : left knee pain\par -twisted at home\par -pain up to hip radiating\par -pins and needles\par - difficulty with ambution\par \par Fell mos ago on left side\par - hurt left hip\par \par Had COVID vaccine\par -staying isolated despite 2/2 crohns\par \par Crohn's "not that great"\par - taking Humra at home\par - seeing Dr Dsouza\par \par HLD\par -self DC'd meds\par \par \par

## 2021-05-17 ENCOUNTER — APPOINTMENT (OUTPATIENT)
Dept: RADIOLOGY | Facility: HOSPITAL | Age: 81
End: 2021-05-17
Payer: MEDICARE

## 2021-05-17 ENCOUNTER — OUTPATIENT (OUTPATIENT)
Dept: OUTPATIENT SERVICES | Facility: HOSPITAL | Age: 81
LOS: 1 days | End: 2021-05-17
Payer: MEDICARE

## 2021-05-17 ENCOUNTER — RESULT REVIEW (OUTPATIENT)
Age: 81
End: 2021-05-17

## 2021-05-17 DIAGNOSIS — M25.562 PAIN IN LEFT KNEE: ICD-10-CM

## 2021-05-17 PROCEDURE — 73502 X-RAY EXAM HIP UNI 2-3 VIEWS: CPT | Mod: 26,LT

## 2021-05-17 PROCEDURE — 73562 X-RAY EXAM OF KNEE 3: CPT | Mod: 26,LT

## 2021-05-17 PROCEDURE — 73502 X-RAY EXAM HIP UNI 2-3 VIEWS: CPT

## 2021-05-17 PROCEDURE — 73562 X-RAY EXAM OF KNEE 3: CPT

## 2021-05-20 ENCOUNTER — RESULT REVIEW (OUTPATIENT)
Age: 81
End: 2021-05-20

## 2021-05-20 ENCOUNTER — APPOINTMENT (OUTPATIENT)
Dept: ULTRASOUND IMAGING | Facility: HOSPITAL | Age: 81
End: 2021-05-20
Payer: MEDICARE

## 2021-05-20 ENCOUNTER — OUTPATIENT (OUTPATIENT)
Dept: OUTPATIENT SERVICES | Facility: HOSPITAL | Age: 81
LOS: 1 days | End: 2021-05-20
Payer: MEDICARE

## 2021-05-20 DIAGNOSIS — M25.562 PAIN IN LEFT KNEE: ICD-10-CM

## 2021-05-20 DIAGNOSIS — R19.8 OTHER SPECIFIED SYMPTOMS AND SIGNS INVOLVING THE DIGESTIVE SYSTEM AND ABDOMEN: ICD-10-CM

## 2021-05-20 PROCEDURE — 76700 US EXAM ABDOM COMPLETE: CPT | Mod: 26

## 2021-05-20 PROCEDURE — 93971 EXTREMITY STUDY: CPT

## 2021-05-20 PROCEDURE — 93971 EXTREMITY STUDY: CPT | Mod: 26,LT

## 2021-05-20 PROCEDURE — 76700 US EXAM ABDOM COMPLETE: CPT

## 2021-05-22 ENCOUNTER — APPOINTMENT (OUTPATIENT)
Dept: RADIOLOGY | Facility: HOSPITAL | Age: 81
End: 2021-05-22

## 2021-05-24 ENCOUNTER — APPOINTMENT (OUTPATIENT)
Dept: RADIOLOGY | Facility: HOSPITAL | Age: 81
End: 2021-05-24

## 2021-05-26 LAB
25(OH)D3 SERPL-MCNC: 27.6 NG/ML
ALBUMIN SERPL ELPH-MCNC: 4.3 G/DL
ALP BLD-CCNC: 1127 U/L
ALT SERPL-CCNC: 17 U/L
ANION GAP SERPL CALC-SCNC: 14 MMOL/L
APPEARANCE: CLEAR
AST SERPL-CCNC: 49 U/L
BACTERIA: NEGATIVE
BASOPHILS # BLD AUTO: 0.04 K/UL
BASOPHILS NFR BLD AUTO: 0.8 %
BILIRUB SERPL-MCNC: 0.5 MG/DL
BILIRUBIN URINE: NEGATIVE
BLOOD URINE: NEGATIVE
BUN SERPL-MCNC: 10 MG/DL
CALCIUM SERPL-MCNC: 9.1 MG/DL
CANCER AG125 SERPL-ACNC: 6 U/ML
CANCER AG19-9 SERPL-ACNC: <2 U/ML
CEA SERPL-MCNC: 2.7 NG/ML
CHLORIDE SERPL-SCNC: 105 MMOL/L
CHOLEST SERPL-MCNC: 241 MG/DL
CO2 SERPL-SCNC: 24 MMOL/L
COLOR: YELLOW
CREAT SERPL-MCNC: 0.69 MG/DL
EOSINOPHIL # BLD AUTO: 0.1 K/UL
EOSINOPHIL NFR BLD AUTO: 1.9 %
ESTIMATED AVERAGE GLUCOSE: 111 MG/DL
GLUCOSE QUALITATIVE U: NEGATIVE
GLUCOSE SERPL-MCNC: 102 MG/DL
HBA1C MFR BLD HPLC: 5.5 %
HCT VFR BLD CALC: 44 %
HDLC SERPL-MCNC: 76 MG/DL
HGB BLD-MCNC: 14.2 G/DL
HYALINE CASTS: 2 /LPF
IMM GRANULOCYTES NFR BLD AUTO: 0.2 %
KETONES URINE: NEGATIVE
LDLC SERPL CALC-MCNC: 139 MG/DL
LEUKOCYTE ESTERASE URINE: NEGATIVE
LYMPHOCYTES # BLD AUTO: 1.74 K/UL
LYMPHOCYTES NFR BLD AUTO: 32.9 %
MAN DIFF?: NORMAL
MCHC RBC-ENTMCNC: 28.5 PG
MCHC RBC-ENTMCNC: 32.3 GM/DL
MCV RBC AUTO: 88.2 FL
MICROSCOPIC-UA: NORMAL
MONOCYTES # BLD AUTO: 0.44 K/UL
MONOCYTES NFR BLD AUTO: 8.3 %
NEUTROPHILS # BLD AUTO: 2.96 K/UL
NEUTROPHILS NFR BLD AUTO: 55.9 %
NITRITE URINE: NEGATIVE
NONHDLC SERPL-MCNC: 166 MG/DL
PH URINE: 6.5
PLATELET # BLD AUTO: 237 K/UL
POTASSIUM SERPL-SCNC: 4.9 MMOL/L
PROT SERPL-MCNC: 7.1 G/DL
PROTEIN URINE: NORMAL
RBC # BLD: 4.99 M/UL
RBC # FLD: 14.2 %
RED BLOOD CELLS URINE: 1 /HPF
SODIUM SERPL-SCNC: 143 MMOL/L
SPECIFIC GRAVITY URINE: 1.02
SQUAMOUS EPITHELIAL CELLS: 2 /HPF
T3 SERPL-MCNC: 138 NG/DL
T3FREE SERPL-MCNC: 2.73 PG/ML
T4 FREE SERPL-MCNC: 1.2 NG/DL
T4 SERPL-MCNC: 9 UG/DL
TRIGL SERPL-MCNC: 134 MG/DL
TSH SERPL-ACNC: 0.35 UIU/ML
UROBILINOGEN URINE: NORMAL
WBC # FLD AUTO: 5.29 K/UL
WHITE BLOOD CELLS URINE: 3 /HPF

## 2021-10-25 ENCOUNTER — OUTPATIENT (OUTPATIENT)
Dept: OUTPATIENT SERVICES | Facility: HOSPITAL | Age: 81
LOS: 1 days | End: 2021-10-25
Payer: MEDICARE

## 2021-10-25 ENCOUNTER — APPOINTMENT (OUTPATIENT)
Dept: RADIOLOGY | Facility: HOSPITAL | Age: 81
End: 2021-10-25
Payer: MEDICARE

## 2021-10-25 DIAGNOSIS — M81.0 AGE-RELATED OSTEOPOROSIS WITHOUT CURRENT PATHOLOGICAL FRACTURE: ICD-10-CM

## 2021-10-25 PROCEDURE — 77080 DXA BONE DENSITY AXIAL: CPT | Mod: 26

## 2021-10-25 PROCEDURE — 77080 DXA BONE DENSITY AXIAL: CPT

## 2021-11-02 ENCOUNTER — APPOINTMENT (OUTPATIENT)
Dept: INTERNAL MEDICINE | Facility: CLINIC | Age: 81
End: 2021-11-02
Payer: MEDICARE

## 2021-11-02 PROCEDURE — 90662 IIV NO PRSV INCREASED AG IM: CPT

## 2021-11-02 PROCEDURE — G0008: CPT

## 2021-12-02 ENCOUNTER — APPOINTMENT (OUTPATIENT)
Dept: INTERNAL MEDICINE | Facility: CLINIC | Age: 81
End: 2021-12-02
Payer: MEDICARE

## 2021-12-02 VITALS
DIASTOLIC BLOOD PRESSURE: 70 MMHG | WEIGHT: 146 LBS | HEART RATE: 78 BPM | HEIGHT: 63 IN | BODY MASS INDEX: 25.87 KG/M2 | RESPIRATION RATE: 15 BRPM | TEMPERATURE: 98.2 F | SYSTOLIC BLOOD PRESSURE: 110 MMHG | OXYGEN SATURATION: 98 %

## 2021-12-02 DIAGNOSIS — Z00.00 ENCOUNTER FOR GENERAL ADULT MEDICAL EXAMINATION W/OUT ABNORMAL FINDINGS: ICD-10-CM

## 2021-12-02 DIAGNOSIS — E56.9 VITAMIN DEFICIENCY, UNSPECIFIED: ICD-10-CM

## 2021-12-02 DIAGNOSIS — M81.0 AGE-RELATED OSTEOPOROSIS W/OUT CURRENT PATHOLOGICAL FRACTURE: ICD-10-CM

## 2021-12-02 PROCEDURE — G0439: CPT

## 2021-12-02 PROCEDURE — G0444 DEPRESSION SCREEN ANNUAL: CPT

## 2021-12-02 NOTE — PHYSICAL EXAM
[No Acute Distress] : no acute distress [Well Nourished] : well nourished [Well Developed] : well developed [Well-Appearing] : well-appearing [Normal Sclera/Conjunctiva] : normal sclera/conjunctiva [PERRL] : pupils equal round and reactive to light [EOMI] : extraocular movements intact [Normal Outer Ear/Nose] : the outer ears and nose were normal in appearance [Normal Oropharynx] : the oropharynx was normal [No JVD] : no jugular venous distention [No Lymphadenopathy] : no lymphadenopathy [Supple] : supple [Thyroid Normal, No Nodules] : the thyroid was normal and there were no nodules present [No Respiratory Distress] : no respiratory distress  [No Accessory Muscle Use] : no accessory muscle use [Clear to Auscultation] : lungs were clear to auscultation bilaterally [Normal Rate] : normal rate  [Regular Rhythm] : with a regular rhythm [Normal S1, S2] : normal S1 and S2 [II] : a grade 2 [No Carotid Bruits] : no carotid bruits [No Abdominal Bruit] : a ~M bruit was not heard ~T in the abdomen [No Varicosities] : no varicosities [Pedal Pulses Present] : the pedal pulses are present [No Edema] : there was no peripheral edema [No Palpable Aorta] : no palpable aorta [No Extremity Clubbing/Cyanosis] : no extremity clubbing/cyanosis [Soft] : abdomen soft [Non Tender] : non-tender [Non-distended] : non-distended [No Masses] : no abdominal mass palpated [No HSM] : no HSM [Normal Bowel Sounds] : normal bowel sounds [Normal Posterior Cervical Nodes] : no posterior cervical lymphadenopathy [Normal Anterior Cervical Nodes] : no anterior cervical lymphadenopathy [No CVA Tenderness] : no CVA  tenderness [No Spinal Tenderness] : no spinal tenderness [No Joint Swelling] : no joint swelling [Grossly Normal Strength/Tone] : grossly normal strength/tone [No Rash] : no rash [Coordination Grossly Intact] : coordination grossly intact [No Focal Deficits] : no focal deficits [Normal Gait] : normal gait [Deep Tendon Reflexes (DTR)] : deep tendon reflexes were 2+ and symmetric [Normal Affect] : the affect was normal [Normal Insight/Judgement] : insight and judgment were intact

## 2021-12-02 NOTE — HISTORY OF PRESENT ILLNESS
[FreeTextEntry1] : Here for annual [de-identified] : Crohn's still active sees Dr Dsouza but his office moved

## 2021-12-02 NOTE — HEALTH RISK ASSESSMENT
[Fair] : ~his/her~ current health as fair  [Good] : ~his/her~  mood as  good [No] : In the past 12 months have you used drugs other than those required for medical reasons? No [No falls in past year] : Patient reported no falls in the past year [0] : 2) Feeling down, depressed, or hopeless: Not at all (0) [HIV test declined] : HIV test declined [Hepatitis C test declined] : Hepatitis C test declined [None] : None [With Family] : lives with family [Retired] : retired [] :  [Feels Safe at Home] : Feels safe at home [Neglect Or Abandonment] : neglect or abandonment [Fully functional (bathing, dressing, toileting, transferring, walking, feeding)] : Fully functional (bathing, dressing, toileting, transferring, walking, feeding) [Fully functional (using the telephone, shopping, preparing meals, housekeeping, doing laundry, using] : Fully functional and needs no help or supervision to perform IADLs (using the telephone, shopping, preparing meals, housekeeping, doing laundry, using transportation, managing medications and managing finances) [Reports changes in hearing] : Reports changes in hearing [Smoke Detector] : smoke detector [Safety elements used in home] : safety elements used in home [Seat Belt] :  uses seat belt [Sunscreen] : uses sunscreen [] : No [de-identified] : no [de-identified] : no only in the house  [de-identified] : not so healthy no appetite  [Change in mental status noted] : No change in mental status noted [Language] : denies difficulty with language [Behavior] : denies difficulty with behavior [Learning/Retaining New Information] : denies difficulty learning/retaining new information [Handling Complex Tasks] : denies difficulty handling complex tasks [Reasoning] : denies difficulty with reasoning [Spatial Ability and Orientation] : denies difficulty with spatial ability and orientation [With Significant Other] : lives with significant other [Sexually Active] : not sexually active [High Risk Behavior] : no high risk behavior [Reports changes in vision] : Reports no changes in vision [Reports changes in dental health] : Reports no changes in dental health [BoneDensityDate] : 05/21 [de-identified] : a bit [place]

## 2022-03-16 ENCOUNTER — OUTPATIENT (OUTPATIENT)
Dept: OUTPATIENT SERVICES | Facility: HOSPITAL | Age: 82
LOS: 1 days | End: 2022-03-16
Payer: MEDICARE

## 2022-03-16 ENCOUNTER — APPOINTMENT (OUTPATIENT)
Dept: MRI IMAGING | Facility: HOSPITAL | Age: 82
End: 2022-03-16
Payer: MEDICARE

## 2022-03-16 DIAGNOSIS — Z00.8 ENCOUNTER FOR OTHER GENERAL EXAMINATION: ICD-10-CM

## 2022-03-16 PROCEDURE — 72197 MRI PELVIS W/O & W/DYE: CPT | Mod: MH

## 2022-03-16 PROCEDURE — 74183 MRI ABD W/O CNTR FLWD CNTR: CPT | Mod: MH

## 2022-03-16 PROCEDURE — A9579: CPT

## 2022-03-16 PROCEDURE — 72197 MRI PELVIS W/O & W/DYE: CPT | Mod: 26,MH

## 2022-03-16 PROCEDURE — 74183 MRI ABD W/O CNTR FLWD CNTR: CPT | Mod: 26,MH

## 2022-10-17 ENCOUNTER — APPOINTMENT (OUTPATIENT)
Dept: INTERNAL MEDICINE | Facility: CLINIC | Age: 82
End: 2022-10-17

## 2022-10-17 VITALS
WEIGHT: 133 LBS | SYSTOLIC BLOOD PRESSURE: 122 MMHG | TEMPERATURE: 97.7 F | HEART RATE: 83 BPM | HEIGHT: 62 IN | RESPIRATION RATE: 15 BRPM | DIASTOLIC BLOOD PRESSURE: 80 MMHG | BODY MASS INDEX: 24.48 KG/M2 | OXYGEN SATURATION: 96 %

## 2022-10-17 DIAGNOSIS — F41.9 ANXIETY DISORDER, UNSPECIFIED: ICD-10-CM

## 2022-10-17 DIAGNOSIS — Z23 ENCOUNTER FOR IMMUNIZATION: ICD-10-CM

## 2022-10-17 PROCEDURE — 99214 OFFICE O/P EST MOD 30 MIN: CPT | Mod: 25

## 2022-10-17 PROCEDURE — G0008: CPT

## 2022-10-17 PROCEDURE — 90662 IIV NO PRSV INCREASED AG IM: CPT

## 2022-10-17 NOTE — HISTORY OF PRESENT ILLNESS
[FreeTextEntry1] : here for follow up  [de-identified] : Here for follow up \par \par did not yet get new booster\par \par Saw GI [facility] for Crohn's \par -continues on Humira\par -presently on a prednisone taper\par

## 2023-05-10 ENCOUNTER — APPOINTMENT (OUTPATIENT)
Dept: INTERNAL MEDICINE | Facility: CLINIC | Age: 83
End: 2023-05-10
Payer: MEDICARE

## 2023-05-10 VITALS
HEART RATE: 100 BPM | DIASTOLIC BLOOD PRESSURE: 80 MMHG | OXYGEN SATURATION: 98 % | TEMPERATURE: 97.2 F | WEIGHT: 140 LBS | SYSTOLIC BLOOD PRESSURE: 130 MMHG | HEIGHT: 62 IN | BODY MASS INDEX: 25.76 KG/M2 | RESPIRATION RATE: 16 BRPM

## 2023-05-10 DIAGNOSIS — E78.5 HYPERLIPIDEMIA, UNSPECIFIED: ICD-10-CM

## 2023-05-10 PROCEDURE — G0439: CPT

## 2023-05-10 RX ORDER — ATORVASTATIN CALCIUM 20 MG/1
20 TABLET, FILM COATED ORAL
Refills: 0 | Status: ACTIVE | COMMUNITY

## 2023-05-10 RX ORDER — EZETIMIBE 10 MG/1
10 TABLET ORAL
Refills: 0 | Status: ACTIVE | COMMUNITY

## 2023-05-10 NOTE — HEALTH RISK ASSESSMENT
[Fair] : ~his/her~ current health as fair  [Good] : ~his/her~  mood as  good [No] : In the past 12 months have you used drugs other than those required for medical reasons? No [No falls in past year] : Patient reported no falls in the past year [0] : 2) Feeling down, depressed, or hopeless: Not at all (0) [Patient reported bone density results were abnormal] : Patient reported bone density results were abnormal [HIV test declined] : HIV test declined [Hepatitis C test declined] : Hepatitis C test declined [None] : None [With Significant Other] : lives with significant other [With Family] : lives with family [Retired] : retired [] :  [Feels Safe at Home] : Feels safe at home [Neglect Or Abandonment] : neglect or abandonment [Fully functional (bathing, dressing, toileting, transferring, walking, feeding)] : Fully functional (bathing, dressing, toileting, transferring, walking, feeding) [Fully functional (using the telephone, shopping, preparing meals, housekeeping, doing laundry, using] : Fully functional and needs no help or supervision to perform IADLs (using the telephone, shopping, preparing meals, housekeeping, doing laundry, using transportation, managing medications and managing finances) [Reports changes in hearing] : Reports changes in hearing [Smoke Detector] : smoke detector [Safety elements used in home] : safety elements used in home [Seat Belt] :  uses seat belt [Sunscreen] : uses sunscreen [Never] : Never [de-identified] : no [de-identified] : no only in the house  [de-identified] : not so healthy no appetite  [Change in mental status noted] : No change in mental status noted [Language] : denies difficulty with language [Behavior] : denies difficulty with behavior [Learning/Retaining New Information] : denies difficulty learning/retaining new information [Handling Complex Tasks] : denies difficulty handling complex tasks [Reasoning] : denies difficulty with reasoning [Spatial Ability and Orientation] : denies difficulty with spatial ability and orientation [Sexually Active] : not sexually active [High Risk Behavior] : no high risk behavior [Reports changes in vision] : Reports no changes in vision [Reports changes in dental health] : Reports no changes in dental health [BoneDensityDate] : 05/21 [de-identified] : a bit [place]

## 2023-05-10 NOTE — ASSESSMENT
[FreeTextEntry1] : pt advised to increase Calcium and D and if improved D on labs consider bisphosphonate\par

## 2023-05-10 NOTE — DATA REVIEWED
[FreeTextEntry1] : REVIEWED ALL LABS DONE BY DR RUIZ,\par \par CAROTIDS <50% both carotid arteries not obstructive

## 2023-06-07 LAB
ALBUMIN SERPL ELPH-MCNC: 4.1 G/DL
ALP BLD-CCNC: 904 U/L
ALT SERPL-CCNC: 9 U/L
ANION GAP SERPL CALC-SCNC: 17 MMOL/L
AST SERPL-CCNC: 35 U/L
BILIRUB SERPL-MCNC: 0.6 MG/DL
BUN SERPL-MCNC: 8 MG/DL
CALCIUM SERPL-MCNC: 8.9 MG/DL
CHLORIDE SERPL-SCNC: 102 MMOL/L
CHOLEST SERPL-MCNC: 153 MG/DL
CO2 SERPL-SCNC: 25 MMOL/L
CREAT SERPL-MCNC: 0.65 MG/DL
EGFR: 88 ML/MIN/1.73M2
GLUCOSE SERPL-MCNC: 100 MG/DL
HDLC SERPL-MCNC: 68 MG/DL
LDLC SERPL CALC-MCNC: 62 MG/DL
NONHDLC SERPL-MCNC: 85 MG/DL
POTASSIUM SERPL-SCNC: 4.2 MMOL/L
PROT SERPL-MCNC: 7.3 G/DL
SODIUM SERPL-SCNC: 144 MMOL/L
TRIGL SERPL-MCNC: 114 MG/DL

## 2023-06-22 ENCOUNTER — APPOINTMENT (OUTPATIENT)
Dept: GASTROENTEROLOGY | Facility: CLINIC | Age: 83
End: 2023-06-22
Payer: MEDICARE

## 2023-06-22 ENCOUNTER — LABORATORY RESULT (OUTPATIENT)
Age: 83
End: 2023-06-22

## 2023-06-22 VITALS
OXYGEN SATURATION: 99 % | HEIGHT: 62 IN | TEMPERATURE: 98 F | DIASTOLIC BLOOD PRESSURE: 78 MMHG | RESPIRATION RATE: 16 BRPM | SYSTOLIC BLOOD PRESSURE: 123 MMHG | HEART RATE: 108 BPM

## 2023-06-22 DIAGNOSIS — K50.90 CROHN'S DISEASE, UNSPECIFIED, W/OUT COMPLICATIONS: ICD-10-CM

## 2023-06-22 DIAGNOSIS — R14.0 ABDOMINAL DISTENSION (GASEOUS): ICD-10-CM

## 2023-06-22 DIAGNOSIS — R74.8 ABNORMAL LEVELS OF OTHER SERUM ENZYMES: ICD-10-CM

## 2023-06-22 DIAGNOSIS — K44.9 DIAPHRAGMATIC HERNIA W/OUT OBSTRUCTION OR GANGRENE: ICD-10-CM

## 2023-06-22 DIAGNOSIS — R19.8 OTHER SPECIFIED SYMPTOMS AND SIGNS INVOLVING THE DIGESTIVE SYSTEM AND ABDOMEN: ICD-10-CM

## 2023-06-22 PROCEDURE — 99205 OFFICE O/P NEW HI 60 MIN: CPT

## 2023-06-22 NOTE — ASSESSMENT
[FreeTextEntry1] : My impression is that of an elderly female who, by history, presented with acute ileitis within the decade associated with uveitis, diagnosed with Crohn's disease.  Review of laboratories demonstrate a markedly elevated alkaline phosphatase which may be related to her Paget's disease but PSC is to be considered.  She has ongoing abdominal distention and GI issues that may or may not not be related to her ileitis.\par \par I sent off blood work to try to determine the nature of the elevated alkaline phosphatase, checking isoenzymes and a CA 19-9.\par \par Of ordered an MRI enterography and an MRCP.  I given the patient a gas reducing supplement to try and recommended daily exercise.\par \par Follow-up in 2 months.

## 2023-06-22 NOTE — PHYSICAL EXAM
[Alert] : alert [Normal Voice/Communication] : normal voice/communication [Healthy Appearing] : healthy appearing [No Acute Distress] : no acute distress [Sclera] : the sclera and conjunctiva were normal [Hearing Threshold Finger Rub Not [place]] : hearing was normal [Normal Lips/Gums] : the lips and gums were normal [Oropharynx] : the oropharynx was normal [Normal Appearance] : the appearance of the neck was normal [No Neck Mass] : no neck mass was observed [No Respiratory Distress] : no respiratory distress [No Acc Muscle Use] : no accessory muscle use [Respiration, Rhythm And Depth] : normal respiratory rhythm and effort [Auscultation Breath Sounds / Voice Sounds] : lungs were clear to auscultation bilaterally [Heart Rate And Rhythm] : heart rate was normal and rhythm regular [Normal S1, S2] : normal S1 and S2 [Bowel Sounds] : normal bowel sounds [Abdomen Tenderness] : non-tender [No Masses] : no abdominal mass palpated [Abdomen Soft] : soft [] : no hepatosplenomegaly [Umbilical] : umbilical [Oriented To Time, Place, And Person] : oriented to person, place, and time [de-identified] : syst murmur

## 2023-06-22 NOTE — HISTORY OF PRESENT ILLNESS
[FreeTextEntry1] : Dx'd with Crohn's ileitis and uveitis >6 years ago, tx'd with MTX and 6MP in the past, now on Humira for 3-4 years.\par Last colonoscopy 4-5 years ago.\par \par 2-3 BM's per day, often post prandial.  No BRPBR.\par No f/c\par No hb/odyno/dysphagia but known HH.\par \par Chronically elevated alk phos with known Paget's.  Normal sono 2019.\par \par Known valvular heart dz... may need repair. [de-identified] : 5/20/2021

## 2023-06-22 NOTE — REVIEW OF SYSTEMS
[As Noted in HPI] : as noted in HPI [Joint Swelling] : joint swelling [Joint Stiffness] : joint stiffness [Negative] : Heme/Lymph [SOB on Exertion] : shortness of breath during exertion [FreeTextEntry3] : hx/o uveitis [FreeTextEntry5] : valve heart dz

## 2023-06-23 LAB — CANCER AG19-9 SERPL-ACNC: <2 U/ML

## 2023-06-26 LAB
BAKER'S YEAST AB QL: 10.9 UNITS
BAKER'S YEAST IGA QL IA: <5 UNITS
BAKER'S YEAST IGA QN IA: NEGATIVE
BAKER'S YEAST IGG QN IA: NEGATIVE

## 2023-06-29 LAB
ALP BLD-CCNC: 784 IU/L
ALP BONE CFR SERPL: 30 %
ALP INTEST CFR SERPL: 3 %
ALP LIVER CFR SERPL: 67 %

## 2023-07-08 ENCOUNTER — OUTPATIENT (OUTPATIENT)
Dept: OUTPATIENT SERVICES | Facility: HOSPITAL | Age: 83
LOS: 1 days | End: 2023-07-08
Payer: MEDICARE

## 2023-07-08 ENCOUNTER — APPOINTMENT (OUTPATIENT)
Dept: MRI IMAGING | Facility: HOSPITAL | Age: 83
End: 2023-07-08
Payer: MEDICARE

## 2023-07-08 ENCOUNTER — RESULT REVIEW (OUTPATIENT)
Age: 83
End: 2023-07-08

## 2023-07-08 DIAGNOSIS — K50.90 CROHN'S DISEASE, UNSPECIFIED, WITHOUT COMPLICATIONS: ICD-10-CM

## 2023-07-08 PROCEDURE — 72197 MRI PELVIS W/O & W/DYE: CPT | Mod: 26,MH

## 2023-07-08 PROCEDURE — 74183 MRI ABD W/O CNTR FLWD CNTR: CPT | Mod: 26,MH

## 2023-07-08 PROCEDURE — 72197 MRI PELVIS W/O & W/DYE: CPT

## 2023-07-08 PROCEDURE — 74183 MRI ABD W/O CNTR FLWD CNTR: CPT

## 2023-07-18 ENCOUNTER — EMERGENCY (EMERGENCY)
Facility: HOSPITAL | Age: 83
LOS: 1 days | Discharge: HOME | End: 2023-07-18
Attending: INTERNAL MEDICINE | Admitting: INTERNAL MEDICINE
Payer: MEDICARE

## 2023-07-18 VITALS
SYSTOLIC BLOOD PRESSURE: 135 MMHG | HEIGHT: 63 IN | OXYGEN SATURATION: 97 % | RESPIRATION RATE: 19 BRPM | TEMPERATURE: 98 F | DIASTOLIC BLOOD PRESSURE: 80 MMHG | WEIGHT: 134.92 LBS | HEART RATE: 118 BPM

## 2023-07-18 PROCEDURE — 90715 TDAP VACCINE 7 YRS/> IM: CPT

## 2023-07-18 PROCEDURE — 73562 X-RAY EXAM OF KNEE 3: CPT | Mod: 26,50

## 2023-07-18 PROCEDURE — 73562 X-RAY EXAM OF KNEE 3: CPT

## 2023-07-18 PROCEDURE — 73060 X-RAY EXAM OF HUMERUS: CPT

## 2023-07-18 PROCEDURE — 99284 EMERGENCY DEPT VISIT MOD MDM: CPT | Mod: 25

## 2023-07-18 PROCEDURE — 73610 X-RAY EXAM OF ANKLE: CPT | Mod: 26,LT

## 2023-07-18 PROCEDURE — 73060 X-RAY EXAM OF HUMERUS: CPT | Mod: 26,LT

## 2023-07-18 PROCEDURE — 90471 IMMUNIZATION ADMIN: CPT

## 2023-07-18 PROCEDURE — 73610 X-RAY EXAM OF ANKLE: CPT

## 2023-07-18 PROCEDURE — 99284 EMERGENCY DEPT VISIT MOD MDM: CPT

## 2023-07-18 RX ORDER — ACETAMINOPHEN 500 MG
975 TABLET ORAL EVERY 6 HOURS
Refills: 0 | Status: DISCONTINUED | OUTPATIENT
Start: 2023-07-18 | End: 2023-07-22

## 2023-07-18 RX ORDER — TETANUS TOXOID, REDUCED DIPHTHERIA TOXOID AND ACELLULAR PERTUSSIS VACCINE, ADSORBED 5; 2.5; 8; 8; 2.5 [IU]/.5ML; [IU]/.5ML; UG/.5ML; UG/.5ML; UG/.5ML
0.5 SUSPENSION INTRAMUSCULAR ONCE
Refills: 0 | Status: COMPLETED | OUTPATIENT
Start: 2023-07-18 | End: 2023-07-18

## 2023-07-18 RX ADMIN — Medication 300 MILLIGRAM(S): at 23:06

## 2023-07-18 RX ADMIN — TETANUS TOXOID, REDUCED DIPHTHERIA TOXOID AND ACELLULAR PERTUSSIS VACCINE, ADSORBED 0.5 MILLILITER(S): 5; 2.5; 8; 8; 2.5 SUSPENSION INTRAMUSCULAR at 22:59

## 2023-07-19 VITALS
OXYGEN SATURATION: 98 % | HEART RATE: 98 BPM | DIASTOLIC BLOOD PRESSURE: 68 MMHG | RESPIRATION RATE: 20 BRPM | SYSTOLIC BLOOD PRESSURE: 128 MMHG

## 2023-07-19 NOTE — ED PROVIDER NOTE - CARE PROVIDER_API CALL
Tarik Thompson  Orthopaedic Surgery  [address], [address]  [place] [address]  Phone: [phone]  Fax: [phone]  Follow Up Time:

## 2023-07-19 NOTE — ED PROVIDER NOTE - CARE PLAN
1 Principal Discharge DX:	Contusion of knee, initial encounter  Secondary Diagnosis:	Sprain of ligament of ankle  Secondary Diagnosis:	Laceration of skin of left elbow

## 2023-07-19 NOTE — ED PROVIDER NOTE - PHYSICAL EXAMINATION
General:     NAD, well-nourished, well-appearing  Head:     NC/AT, EOMI, oral mucosa moist  Neck:     trachea midline  Lungs:     CTA b/l, no w/r/r  CVS:     S1S2, RRR, no m/g/r  Abd:     +BS, s/nt/nd, no organomegaly  Ext:    2+ radial and pedal pulses, no c/c/e 2 cm laceration to the left elbow swelling both knees full range of motion in the hip and the knees swelling left ankle lateral aspect patient able to bear weight able to ambulate with walker  Neuro: AAOx3, no sensory/motor deficits

## 2023-07-19 NOTE — ED PROVIDER NOTE - PATIENT PORTAL LINK FT
You can access the FollowMyHealth Patient Portal offered by [facility] by registering at the following website: [email or web address]. By joining [facility]’s FollowMyHealth portal, you will also be able to view your health information using other applications (apps) compatible with our system.

## 2023-07-19 NOTE — ED PROVIDER NOTE - NSFOLLOWUPINSTRUCTIONS_ED_ALL_ED_FT
Follow up with your PMD within 1-2 days.  Rest, increase your fluids, advance your activity as tolerated.   Take all of your other medications as previously prescribed.   Worsening, continued or ANY new concerning symptoms return to the emergency department.  Wound check in 2 days suture removal 7 to 10 days ice in the left ankle area and the knee for maximum 5 minutes multiple times a day Tylenol for pain clindamycin 3 times a day for 5 days

## 2023-07-19 NOTE — ED PROVIDER NOTE - CLINICAL SUMMARY MEDICAL DECISION MAKING FREE TEXT BOX
53-year-old female is status post fall came to the emergency room brought in by EMS stated mechanical fall complaining of pain in both knees left arm laceration in the left elbow swelling of the left ankle  Laceration left elbow repaired x-ray both knees left arm and left ankle negative patient given Aircast in the left ankle able to ambulate with a walker patient will be prescribed clindamycin and recommended take Tylenol

## 2023-07-19 NOTE — ED ADULT NURSE NOTE - NSFALLRISKINTERV_ED_ALL_ED

## 2023-07-19 NOTE — ED PROVIDER NOTE - OBJECTIVE STATEMENT
53-year-old female is status post fall came to the emergency room brought in by EMS stated mechanical fall complaining of pain in both knees left arm laceration in the left elbow swelling of the left ankle

## 2023-07-21 ENCOUNTER — TRANSCRIPTION ENCOUNTER (OUTPATIENT)
Age: 83
End: 2023-07-21

## 2023-07-21 ENCOUNTER — EMERGENCY (EMERGENCY)
Facility: HOSPITAL | Age: 83
LOS: 1 days | Discharge: HOME | End: 2023-07-21
Attending: STUDENT IN AN ORGANIZED HEALTH CARE EDUCATION/TRAINING PROGRAM | Admitting: STUDENT IN AN ORGANIZED HEALTH CARE EDUCATION/TRAINING PROGRAM
Payer: MEDICARE

## 2023-07-21 VITALS
DIASTOLIC BLOOD PRESSURE: 81 MMHG | HEIGHT: 63 IN | OXYGEN SATURATION: 99 % | SYSTOLIC BLOOD PRESSURE: 131 MMHG | HEART RATE: 98 BPM | TEMPERATURE: 98 F | RESPIRATION RATE: 18 BRPM | WEIGHT: 134.92 LBS

## 2023-07-21 PROCEDURE — G0463: CPT

## 2023-07-21 PROCEDURE — L9995: CPT

## 2023-07-21 NOTE — ED PROVIDER NOTE - PROGRESS NOTE DETAILS
I have discussed with the patient about the ED workup and need for follow-up with primary care physician/specialists, and return precautions. The patient is subjectively feeling better and would like to be discharged home. All questions regarding answered bedside  The patient verbalizes understanding and agreement with the plan and currently hemodynamically stable, clinically well-appearing and ready for discharge home

## 2023-07-21 NOTE — ED ADULT NURSE NOTE - OBJECTIVE STATEMENT
Pt presents to ED requesting wound check.  S/p fall two days prior with sutures placed by left elbow.  No obvious drainage, redness, or swelling noted.  Pt denies fevers, chills.

## 2023-07-21 NOTE — ED PROVIDER NOTE - PATIENT PORTAL LINK FT
You can access the FollowMyHealth Patient Portal offered by [facility] by registering at the following website: [email or web address]. By joining [facility]’s FollowMyHealth portal, you will also be able to view your health information using other applications (apps) compatible with our system.

## 2023-07-21 NOTE — ED PROVIDER NOTE - NSFOLLOWUPINSTRUCTIONS_ED_ALL_ED_FT
Please take your medication with food and pro-biotic.     Laceration Care, Adult  A laceration is a cut that may go through all layers of the skin. The cut may also go into the tissue that is right under the skin. Some cuts heal on their own. Other cuts need to be closed with stitches (sutures), staples, skin adhesive strips, or skin glue. Taking care of your cut lowers your risk of infection, helps your injury heal better, and may prevent scarring.    General tips  Keep your wound clean and dry.  Do not scratch or pick at your wound.  Wash your hands with soap and water for at least 20 seconds before and after touching your wound or changing your bandage (dressing). If you cannot use soap and water, use hand .  Do not usedisinfectants or antiseptics, such as rubbing alcohol, to clean your wound unless told by your doctor.  If you were given a bandage, change it at least once a day, or as told by your doctor. You should also change it if it gets wet or dirty.  How to take care of your cut  If your doctor used stitches or staples:    Keep the wound fully dry for the first 24 hours, or as told by your doctor. After that, you may take a shower or a bath. Do not soak the wound in water until after the stitches or staples have been taken out.  Clean the wound once a day, or as told by your doctor. To do this:  Wash the wound with soap and water.  Rinse the wound with water to remove all soap.  Pat the wound dry with a clean towel. Do not rub the wound.  After you clean the wound, put a thin layer of antibiotic ointment, another ointment, or a nonstick bandage on it as told by your doctor. This will help to:  Prevent infection.  Keep the bandage from sticking to the wound.  Have your stitches or staples taken out as told by your doctor.  If your doctor used skin adhesive strips:    Do not get the skin adhesive strips wet. You can take a shower or a bath, but keep the wound dry.  If the wound gets wet, pat it dry with a clean towel. Do not rub the wound.  Skin adhesive strips fall off on their own. You can trim the strips as the wound heals. Do not take off any strips that are still stuck to the wound unless told by your doctor. The strips will fall off after a while.  If your doctor used skin glue:    You may take a shower or a bath, but try to keep the wound dry. Do not soak the wound in water.  After you take a shower or a bath, pat the wound dry with a clean towel. Do not rub the wound.  Do not do any activities that will make you sweat a lot until the skin glue has fallen off.  Do not apply liquid, cream, or ointment medicine to your wound while the skin glue is still on.  If a bandage is placed over the wound, do not put tape right on top of the skin glue.  Do not pick at the glue. The skin glue usually stays on for 5–10 days. Then, it falls off the skin.  Follow these instructions at home:  Medicines    Take over-the-counter and prescription medicines only as told by your doctor.  If you were prescribed an antibiotic medicine, take or apply it as told by your doctor. Do not stop using it even if you start to feel better.  Managing pain and swelling    If told, put ice on the injured area. To do this:  Put ice in a plastic bag.  Place a towel between your skin and the bag.  Leave the ice on for 20 minutes, 2–3 times a day.  Take off the ice if your skin turns bright red. This is very important. If you cannot feel pain, heat, or cold, you have a greater risk of damage to the area.  Raise the injured area above the level of your heart while you are sitting or lying down.  General instructions    Two wounds closed with skin glue. One is normal. The other is red with pus and infected.  Avoid any activity that could make your wound reopen.  Check your wound every day for signs of infection. Check for:  More redness, swelling, or pain.  Fluid or blood.  Warmth.  Pus or a bad smell.  Keep all follow-up visits.  Contact a doctor if:  You got a tetanus shot and you have any of these problems where the needle went in:  Swelling.  Very bad pain.  Redness.  Bleeding.  A wound that was closed breaks open.  You have a fever.  You have any of these signs of infection in your wound:  More redness, swelling, or pain.  Fluid or blood.  Warmth.  Pus or a bad smell.  You see something coming out of the wound, such as wood or glass.  Medicine does not make your pain go away.  You notice a change in the color of your skin near your wound.  You need to change the bandage often.  You have a new rash.  You lose feeling (have numbness) around the wound.  Get help right away if:  You have very bad swelling around the wound.  Your pain suddenly gets worse and is very bad.  You have painful lumps near the wound or on skin anywhere on your body.  You have a red streak going away from your wound.  The wound is on your hand or foot, and:  You cannot move a finger or toe.  Your fingers or toes look pale or bluish.  Summary  A laceration is a cut that may go through all layers of the skin. The cut may also go into the tissue right under the skin.  Some cuts heal on their own. Others need to be closed with stitches, staples, skin adhesive strips, or skin glue.  Follow your doctor's instructions for caring for your cut. Proper care of a cut lowers the risk of infection, helps the cut heal better, and may prevent scarring.  This information is not intended to replace advice given to you by your health care provider. Make sure you discuss any questions you have with your health care provider.

## 2023-07-21 NOTE — ED ADULT NURSE NOTE - NSFALLRISKINTERV_ED_ALL_ED

## 2023-07-21 NOTE — ED PROVIDER NOTE - PHYSICAL EXAMINATION
Vital signs reviewed  GENERAL: Patient nontoxic appearing, NAD  HEAD: NCAT  EYES: Anicteric  ENT: MMM  NECK: Supple, non tender  RESPIRATORY: Normal respiratory effort. CTA B/L. No wheezing, rales, rhonchi  CARDIOVASCULAR: Regular rate and rhythm  ABDOMEN: Soft. Nondistended. Nontender. No guarding or rebound. No CVA tenderness.  MUSCULOSKELETAL/EXTREMITIES: Brisk cap refill. 2+ radial pulses. 1+ pitting edema over left lower leg, no erythema, warmth, calf tenderness. knee ROM intact  +left elbow laceration with running sutures intact. mild ecchymosis surrounding elbow. ROM intact  2+ radial pulse. +ecchymosis over left upper arm.   SKIN:  Warm and dry  NEURO: AAOx3. No gross FND.  PSYCHIATRIC: Cooperative. Affect appropriate.

## 2023-07-21 NOTE — ED PROVIDER NOTE - OBJECTIVE STATEMENT
83y F presents after fall x 2 days ago, has sutures to left elbow, here for wound check  no fever, drainage, decrease ROM, worsening pain, or redness reported.

## 2023-07-21 NOTE — ED PROVIDER NOTE - CLINICAL SUMMARY MEDICAL DECISION MAKING FREE TEXT BOX
83y F presents after fall x 2 days ago, has sutures to left elbow, here for wound check  no worsening pain, redness, drainage, fever  likely not infected, septic joint  likely discharge

## 2023-07-21 NOTE — CHART NOTE - NSCHARTNOTEFT_GEN_A_CORE
SW placed call to patient to discuss and assist with follow up care.  Patient presented to ED on 7/18/23 due to laceration.  Patients  reports patient will return to ED in 7-10 days from initial injury for suture removal.   declined needing assistance with orthopedist follow up appt at this time.   encouraged to call ED SW if further SW needs arise.

## 2023-07-26 ENCOUNTER — EMERGENCY (EMERGENCY)
Facility: HOSPITAL | Age: 83
LOS: 1 days | Discharge: HOME | End: 2023-07-26
Attending: INTERNAL MEDICINE | Admitting: EMERGENCY MEDICINE
Payer: MEDICARE

## 2023-07-26 VITALS
WEIGHT: 136.91 LBS | TEMPERATURE: 98 F | HEART RATE: 98 BPM | HEIGHT: 63 IN | RESPIRATION RATE: 18 BRPM | SYSTOLIC BLOOD PRESSURE: 113 MMHG | OXYGEN SATURATION: 100 % | DIASTOLIC BLOOD PRESSURE: 72 MMHG

## 2023-07-26 PROCEDURE — 99282 EMERGENCY DEPT VISIT SF MDM: CPT

## 2023-07-26 PROCEDURE — G0463: CPT

## 2023-07-26 NOTE — ED PROVIDER NOTE - NSFOLLOWUPINSTRUCTIONS_ED_ALL_ED_FT
Follow up with your PMD within 1-2 days.  Rest, increase your fluids, advance your activity as tolerated.   Take all of your other medications as previously prescribed.   Worsening, continued or ANY new concerning symptoms return to the emergency department.  Bacitracin once a day Keep the wound covered for a few days

## 2023-07-26 NOTE — ED PROVIDER NOTE - OBJECTIVE STATEMENT
84-year-old female came to the emergency room with chief complaint of suture removal.  Visible secondary to the wound is healing well redness swelling discharge

## 2023-07-26 NOTE — ED PROVIDER NOTE - PATIENT PORTAL LINK FT
You can access the FollowMyHealth Patient Portal offered by [facility] by registering at the following website: [email or web address]. By joining [facility]’s FollowMyHealth portal, you will also be able to view your health information using other applications (apps) compatible with our system.

## 2023-07-26 NOTE — ED ADULT TRIAGE NOTE - MEANS OF ARRIVAL
Pt is wondering if her insulin has been delivered to the [facility]. Pt stated that her Lantus insulin gets sent to the [facility] directly from the mail order pharmacy. Pt is going out of [facility]. She has asked that the nurse please call her back.  
Spoke with patient regarding below. Will notify patient it is delivered. Patient will call when she knows who will be able to pick it up for her.   
ambulatory

## 2023-07-26 NOTE — ED PROVIDER NOTE - PHYSICAL EXAMINATION
General:     NAD, well-nourished, well-appearing  Head:     NC/AT, EOMI, oral mucosa moist  Neck:     trachea midline  Lungs:     CTA b/l, no w/r/r  CVS:     S1S2, RRR, no m/g/r  Abd:     +BS, s/nt/nd, no organomegaly  Ext:    2+ radial and pedal pulses, no c/c/e  Left elbow sutured wound clean and dry intact  Neuro: AAOx3, no sensory/motor deficits

## 2023-07-31 ENCOUNTER — OUTPATIENT (OUTPATIENT)
Dept: OUTPATIENT SERVICES | Facility: HOSPITAL | Age: 83
LOS: 1 days | End: 2023-07-31
Payer: MEDICARE

## 2023-07-31 ENCOUNTER — APPOINTMENT (OUTPATIENT)
Dept: CT IMAGING | Facility: HOSPITAL | Age: 83
End: 2023-07-31
Payer: MEDICARE

## 2023-07-31 DIAGNOSIS — R53.1 WEAKNESS: ICD-10-CM

## 2023-07-31 PROBLEM — Z78.9 OTHER SPECIFIED HEALTH STATUS: Chronic | Status: ACTIVE | Noted: 2023-07-26

## 2023-07-31 PROCEDURE — 70450 CT HEAD/BRAIN W/O DYE: CPT | Mod: 26,MH

## 2023-07-31 PROCEDURE — 70450 CT HEAD/BRAIN W/O DYE: CPT | Mod: MH

## 2023-09-08 ENCOUNTER — APPOINTMENT (OUTPATIENT)
Dept: GASTROENTEROLOGY | Facility: CLINIC | Age: 83
End: 2023-09-08